# Patient Record
Sex: MALE | Race: WHITE | Employment: UNEMPLOYED | URBAN - METROPOLITAN AREA
[De-identification: names, ages, dates, MRNs, and addresses within clinical notes are randomized per-mention and may not be internally consistent; named-entity substitution may affect disease eponyms.]

---

## 2023-09-06 ENCOUNTER — OFFICE VISIT (OUTPATIENT)
Dept: ENT CLINIC | Age: 68
End: 2023-09-06
Payer: MEDICARE

## 2023-09-06 VITALS
BODY MASS INDEX: 29.92 KG/M2 | WEIGHT: 202 LBS | OXYGEN SATURATION: 93 % | TEMPERATURE: 98.2 F | HEIGHT: 69 IN | DIASTOLIC BLOOD PRESSURE: 89 MMHG | SYSTOLIC BLOOD PRESSURE: 146 MMHG | HEART RATE: 97 BPM

## 2023-09-06 DIAGNOSIS — H91.93 BILATERAL HEARING LOSS, UNSPECIFIED HEARING LOSS TYPE: Primary | ICD-10-CM

## 2023-09-06 DIAGNOSIS — H93.13 TINNITUS OF BOTH EARS: ICD-10-CM

## 2023-09-06 DIAGNOSIS — E05.90 HYPERTHYROIDISM: ICD-10-CM

## 2023-09-06 DIAGNOSIS — J34.2 DNS (DEVIATED NASAL SEPTUM): ICD-10-CM

## 2023-09-06 DIAGNOSIS — H61.23 BILATERAL IMPACTED CERUMEN: ICD-10-CM

## 2023-09-06 DIAGNOSIS — E01.0 THYROMEGALY: ICD-10-CM

## 2023-09-06 PROCEDURE — 99204 OFFICE O/P NEW MOD 45 MIN: CPT | Performed by: STUDENT IN AN ORGANIZED HEALTH CARE EDUCATION/TRAINING PROGRAM

## 2023-09-06 PROCEDURE — 69210 REMOVE IMPACTED EAR WAX UNI: CPT | Performed by: STUDENT IN AN ORGANIZED HEALTH CARE EDUCATION/TRAINING PROGRAM

## 2023-09-06 PROCEDURE — G8427 DOCREV CUR MEDS BY ELIG CLIN: HCPCS | Performed by: STUDENT IN AN ORGANIZED HEALTH CARE EDUCATION/TRAINING PROGRAM

## 2023-09-06 PROCEDURE — 1123F ACP DISCUSS/DSCN MKR DOCD: CPT | Performed by: STUDENT IN AN ORGANIZED HEALTH CARE EDUCATION/TRAINING PROGRAM

## 2023-09-06 PROCEDURE — 1036F TOBACCO NON-USER: CPT | Performed by: STUDENT IN AN ORGANIZED HEALTH CARE EDUCATION/TRAINING PROGRAM

## 2023-09-06 PROCEDURE — G8419 CALC BMI OUT NRM PARAM NOF/U: HCPCS | Performed by: STUDENT IN AN ORGANIZED HEALTH CARE EDUCATION/TRAINING PROGRAM

## 2023-09-06 PROCEDURE — 3017F COLORECTAL CA SCREEN DOC REV: CPT | Performed by: STUDENT IN AN ORGANIZED HEALTH CARE EDUCATION/TRAINING PROGRAM

## 2023-09-06 RX ORDER — DOCUSATE SODIUM 100 MG/1
100 CAPSULE, LIQUID FILLED ORAL 2 TIMES DAILY
COMMUNITY

## 2023-09-06 NOTE — PROGRESS NOTES
[facility]  NEW PATIENT HISTORY AND PHYSICAL NOTE      Patient Name: Shirley Bolton  Medical Record Number:  [id number]  Primary Care Physician:  Pcp No    ChiefComplaint     Chief Complaint   Patient presents with    Ear Problem     Clogged Lt ear,        History of Present Illness     Shirley Bolton is an 79 y.o. male presenting with left ear clogged x 1-2 months. Got water out of right ear with H2O2 and got that side unclogged. + lontstanding chronic gradual hearing loss. + intermittent dull chronic tinnitus. No otalgia. No otorrhea. No history of chronic ear infections. No history of otologic surgery. No family history of early onset hearing loss. + loud noise exposures - machinery. + environmental allergies. Takes zyrtec as needed, approx 4x/week. No nasal spray use. Thyromegaly. Hx of thyroid bioppsy years ago, was bengign. Was on methimazole in past, not recently. Past Medical History     No past medical history on file. Past Surgical History     No past surgical history on file. Family History     No family history on file. Social History     Social History     Tobacco Use    Smoking status: Never    Smokeless tobacco: Never        Allergies     Allergies   Allergen Reactions    Ibuprofen Rash    Naproxen Nausea Only and Shortness Of Breath    Morphine And Related Other (See Comments)     Delirious  Delirious         Medications     Current Outpatient Medications   Medication Sig Dispense Refill    docusate sodium (COLACE) 100 MG capsule Take 1 capsule by mouth 2 times daily      aspirin-acetaminophen-caffeine (EXCEDRIN MIGRAINE) 250-250-65 MG per tablet Take 1 tablet by mouth every 6 hours as needed for Headaches      carbamide peroxide (DEBROX) 6.5 % otic solution Place 5 drops in ear(s) as needed (ear wax buildup) 1 each 3     No current facility-administered medications for this visit.        Review of Systems     REVIEW OF

## 2023-09-07 LAB
T4 FREE SERPL-MCNC: 2.1 NG/DL (ref 0.9–1.8)
TSH SERPL DL<=0.005 MIU/L-ACNC: <0.01 UIU/ML (ref 0.27–4.2)

## 2023-10-19 ENCOUNTER — OFFICE VISIT (OUTPATIENT)
Dept: ENT CLINIC | Age: 68
End: 2023-10-19
Payer: MEDICARE

## 2023-10-19 ENCOUNTER — PROCEDURE VISIT (OUTPATIENT)
Dept: AUDIOLOGY | Age: 68
End: 2023-10-19
Payer: MEDICARE

## 2023-10-19 VITALS
SYSTOLIC BLOOD PRESSURE: 141 MMHG | BODY MASS INDEX: 29.62 KG/M2 | DIASTOLIC BLOOD PRESSURE: 87 MMHG | TEMPERATURE: 98.2 F | HEART RATE: 98 BPM | OXYGEN SATURATION: 94 % | HEIGHT: 69 IN | WEIGHT: 200 LBS

## 2023-10-19 DIAGNOSIS — H93.13 TINNITUS OF BOTH EARS: ICD-10-CM

## 2023-10-19 DIAGNOSIS — H90.3 SENSORINEURAL HEARING LOSS (SNHL) OF BOTH EARS: Primary | ICD-10-CM

## 2023-10-19 DIAGNOSIS — E05.90 HYPERTHYROIDISM: ICD-10-CM

## 2023-10-19 DIAGNOSIS — E01.0 THYROMEGALY: ICD-10-CM

## 2023-10-19 PROCEDURE — G8419 CALC BMI OUT NRM PARAM NOF/U: HCPCS | Performed by: STUDENT IN AN ORGANIZED HEALTH CARE EDUCATION/TRAINING PROGRAM

## 2023-10-19 PROCEDURE — 1123F ACP DISCUSS/DSCN MKR DOCD: CPT | Performed by: STUDENT IN AN ORGANIZED HEALTH CARE EDUCATION/TRAINING PROGRAM

## 2023-10-19 PROCEDURE — 92557 COMPREHENSIVE HEARING TEST: CPT | Performed by: AUDIOLOGIST

## 2023-10-19 PROCEDURE — G8427 DOCREV CUR MEDS BY ELIG CLIN: HCPCS | Performed by: STUDENT IN AN ORGANIZED HEALTH CARE EDUCATION/TRAINING PROGRAM

## 2023-10-19 PROCEDURE — 92567 TYMPANOMETRY: CPT | Performed by: AUDIOLOGIST

## 2023-10-19 PROCEDURE — 1036F TOBACCO NON-USER: CPT | Performed by: STUDENT IN AN ORGANIZED HEALTH CARE EDUCATION/TRAINING PROGRAM

## 2023-10-19 PROCEDURE — 3017F COLORECTAL CA SCREEN DOC REV: CPT | Performed by: STUDENT IN AN ORGANIZED HEALTH CARE EDUCATION/TRAINING PROGRAM

## 2023-10-19 PROCEDURE — G8484 FLU IMMUNIZE NO ADMIN: HCPCS | Performed by: STUDENT IN AN ORGANIZED HEALTH CARE EDUCATION/TRAINING PROGRAM

## 2023-10-19 PROCEDURE — 99213 OFFICE O/P EST LOW 20 MIN: CPT | Performed by: STUDENT IN AN ORGANIZED HEALTH CARE EDUCATION/TRAINING PROGRAM

## 2023-10-19 RX ORDER — METHIMAZOLE 10 MG/1
TABLET ORAL
COMMUNITY
Start: 2023-09-28

## 2023-10-19 RX ORDER — LISINOPRIL 20 MG/1
20 TABLET ORAL DAILY
Qty: 30 TABLET | Refills: 11 | COMMUNITY
Start: 2023-09-28 | End: 2024-09-27

## 2023-10-19 NOTE — PROGRESS NOTES
Jose R Chacon   1955, 79 y.o. male   [id number]       Referring Provider: Lali Flores DO  Referral Type: In an order in [address] [place] [address]    Reason for Visit: Evaluation of suspected change in hearing, tinnitus, or balance. ADULT AUDIOLOGIC EVALUATION      Jose R Chacon is a 79 y.o. male seen today, 10/19/2023, for an initial audiologic evaluation. AUDIOLOGIC AND OTHER PERTINENT MEDICAL HISTORY:        Jose R Chacon noted tinnitus bilaterally, comes and goes, persistent after cerumen removal with Dr. Ricky Lamar last month; feels his hearing improved after ear cleaning and that he is hearing well at this time; some dizziness. Jose R Chacon denied otalgia and otorrhea, as well as any other changes since his last ENT appointment. IMPRESSIONS:       Today's results are consistent with bilateral sensorineural hearing loss with excellent word recognition for both ears; right ear with hypermobile TM and left ear with normal middle ear function. Hearing loss is significant enough to result in difficulty understanding speech in at lest some listening environments. Discussed tinnitus management strategies and considerations for amplification when ready. Follow medical recommendations from Dr. iRcky Lamar. ASSESSMENT AND FINDINGS:       Otoscopy revealed: Cerumen observed in ear canals bilaterally, proceeded with supra aural headphones following tympanograms with normal middle ear function      RIGHT EAR:  Hearing Sensitivity: Mild through 3000 Hz steeply sloping to severe sensorineural hearing loss. Speech Recognition Threshold: 30 dBHL  Word Recognition: Excellent (100%), based on NU-6 25-word list at 55 dBHL, masked, using recorded speech stimuli. Tympanometry: Normal peak pressure with high compliance, Type Ad tympanogram, consistent with hypermobile tympanic membrane. LEFT EAR:  Hearing Sensitivity: Mild through 3000 Hz steeply sloping to severe sensorineural hearing loss.   Speech Recognition

## 2023-11-30 ENCOUNTER — OFFICE VISIT (OUTPATIENT)
Dept: ENDOCRINOLOGY | Age: 68
End: 2023-11-30
Payer: MEDICARE

## 2023-11-30 VITALS
WEIGHT: 201.4 LBS | HEART RATE: 91 BPM | BODY MASS INDEX: 29.83 KG/M2 | HEIGHT: 69 IN | RESPIRATION RATE: 16 BRPM | SYSTOLIC BLOOD PRESSURE: 140 MMHG | DIASTOLIC BLOOD PRESSURE: 81 MMHG

## 2023-11-30 DIAGNOSIS — E04.1 THYROID NODULE, COLD: ICD-10-CM

## 2023-11-30 DIAGNOSIS — E05.90 HYPERTHYROIDISM: ICD-10-CM

## 2023-11-30 DIAGNOSIS — E05.00 GRAVES DISEASE: Primary | ICD-10-CM

## 2023-11-30 PROCEDURE — 99204 OFFICE O/P NEW MOD 45 MIN: CPT | Performed by: INTERNAL MEDICINE

## 2023-11-30 PROCEDURE — G8484 FLU IMMUNIZE NO ADMIN: HCPCS | Performed by: INTERNAL MEDICINE

## 2023-11-30 PROCEDURE — G8419 CALC BMI OUT NRM PARAM NOF/U: HCPCS | Performed by: INTERNAL MEDICINE

## 2023-11-30 PROCEDURE — 1036F TOBACCO NON-USER: CPT | Performed by: INTERNAL MEDICINE

## 2023-11-30 PROCEDURE — 1123F ACP DISCUSS/DSCN MKR DOCD: CPT | Performed by: INTERNAL MEDICINE

## 2023-11-30 PROCEDURE — 3017F COLORECTAL CA SCREEN DOC REV: CPT | Performed by: INTERNAL MEDICINE

## 2023-11-30 PROCEDURE — G8427 DOCREV CUR MEDS BY ELIG CLIN: HCPCS | Performed by: INTERNAL MEDICINE

## 2023-11-30 RX ORDER — COLESEVELAM 180 1/1
625 TABLET ORAL 2 TIMES DAILY WITH MEALS
COMMUNITY
Start: 2023-10-02

## 2023-11-30 RX ORDER — METOPROLOL SUCCINATE 50 MG/1
50 TABLET, EXTENDED RELEASE ORAL DAILY
COMMUNITY
Start: 2023-11-16 | End: 2024-11-15

## 2023-11-30 RX ORDER — ACETAMINOPHEN/DP-HYDRAMINE 500MG-38MG
TABLET ORAL
COMMUNITY
Start: 2011-08-02

## 2023-11-30 NOTE — PROGRESS NOTES
when clinically appropriate.     -----------------------------------------------------------------  Clinical:  The patient has a history of hyperthyroidism and thyroid nodule and is referred for the evaluation. Radiopharmaceutical: I-123 capsule 206 uCi PO. Technical:  Thyroid imaging with a single quantitative uptake determination was performed 24 hours after oral radioiodine administration. Findings:  The 24-hour thyroid uptake is 33.1%, which is elevated. The thyroid images demonstrate a large area of no uptake in the upper pole of the left thyroid gland. The remainder of the thyroid gland is symmetrically enlarged and demonstrates uniform uptake of the radionuclide. Impressions:   1. Focal cold area in the upper pole of the left thyroid gland corresponding to nodule seen on recent ultrasound. This may represent a nonfunctioning adenoma or thyroid neoplasm, histologic correlation is recommended. 2. Remainder of the thyroid gland is enlarged with an elevated 24-hour thyroid uptake consistent with an underlying diffuse toxic goiter. Approved by Ave Sandhoff on 3/12/2015 2:49 PM     -------------------------------------------------------------------------------------    FINAL PATHOLOGICAL DIAGNOSIS:   SPECIMEN:   Fine Needle Aspiration, Left Thyroid       FINAL CYTOLOGIC DIAGNOSIS:   FOLLICULAR LESION OF UNDETERMINED SIGNIFICANCE. Assessment/Plan:   1. Graves disease, biochemical and clinical hyperthyroidism. Diagnosed initially around 2014. He recently restarted methimazole therapy in November 2023. He previously noted tremors and palpitations. Symptoms did improve to some extent over the past 2 weeks. For now would recommend to continue methimazole 10 mg daily with plan to reassess TSH and free T4 in 3 months. Discussed that long-term use of antithyroid medications would be reasonable. -     T4, Free; Future  -     TSH; Future    2. Hyperthyroidism  3.  Thyroid nodule,

## 2024-01-23 ENCOUNTER — HOSPITAL ENCOUNTER (OUTPATIENT)
Age: 69
Discharge: HOME | End: 2024-01-23
Attending: INTERNAL MEDICINE
Payer: MEDICARE

## 2024-01-23 ENCOUNTER — HOSPITAL ENCOUNTER (OUTPATIENT)
Dept: ULTRASOUND IMAGING | Age: 69
Discharge: HOME | End: 2024-01-23
Attending: INTERNAL MEDICINE
Payer: MEDICARE

## 2024-01-23 DIAGNOSIS — E04.1 THYROID NODULE, COLD: ICD-10-CM

## 2024-01-23 LAB
T4 FREE SERPL-MCNC: 0.5 NG/DL (ref 0.9–1.8)
TSH SERPL DL<=0.005 MIU/L-ACNC: 0.29 UIU/ML (ref 0.27–4.2)

## 2024-01-23 PROCEDURE — 36415 COLL VENOUS BLD VENIPUNCTURE: CPT

## 2024-01-23 PROCEDURE — 76536 US EXAM OF HEAD AND NECK: CPT

## 2024-01-23 PROCEDURE — 84439 ASSAY OF FREE THYROXINE: CPT

## 2024-01-23 PROCEDURE — 84443 ASSAY THYROID STIM HORMONE: CPT

## 2024-01-25 DIAGNOSIS — E05.00 GRAVES DISEASE: Primary | ICD-10-CM

## 2024-01-25 RX ORDER — METHIMAZOLE 5 MG/1
5 TABLET ORAL DAILY
Qty: 90 TABLET | Refills: 1 | Status: SHIPPED | OUTPATIENT
Start: 2024-01-25

## 2024-02-23 ENCOUNTER — HOSPITAL ENCOUNTER (OUTPATIENT)
Age: 69
Discharge: HOME | End: 2024-02-23
Payer: MEDICARE

## 2024-02-23 DIAGNOSIS — E05.00 GRAVES DISEASE: ICD-10-CM

## 2024-02-23 LAB
T4 FREE SERPL-MCNC: 1.1 NG/DL (ref 0.9–1.8)
TSH SERPL DL<=0.005 MIU/L-ACNC: 0.05 UIU/ML (ref 0.27–4.2)

## 2024-02-23 PROCEDURE — 84439 ASSAY OF FREE THYROXINE: CPT

## 2024-02-23 PROCEDURE — 84443 ASSAY THYROID STIM HORMONE: CPT

## 2024-02-23 PROCEDURE — 36415 COLL VENOUS BLD VENIPUNCTURE: CPT

## 2024-02-29 ENCOUNTER — OFFICE VISIT (OUTPATIENT)
Dept: ENDOCRINOLOGY | Age: 69
End: 2024-02-29
Payer: MEDICARE

## 2024-02-29 VITALS
SYSTOLIC BLOOD PRESSURE: 147 MMHG | HEART RATE: 95 BPM | HEIGHT: 69 IN | WEIGHT: 202.8 LBS | RESPIRATION RATE: 16 BRPM | DIASTOLIC BLOOD PRESSURE: 92 MMHG | BODY MASS INDEX: 30.04 KG/M2

## 2024-02-29 DIAGNOSIS — E04.1 THYROID NODULE, COLD: ICD-10-CM

## 2024-02-29 DIAGNOSIS — E05.90 HYPERTHYROIDISM: Primary | ICD-10-CM

## 2024-02-29 DIAGNOSIS — E05.00 GRAVES DISEASE: ICD-10-CM

## 2024-02-29 PROCEDURE — 1036F TOBACCO NON-USER: CPT | Performed by: INTERNAL MEDICINE

## 2024-02-29 PROCEDURE — G8419 CALC BMI OUT NRM PARAM NOF/U: HCPCS | Performed by: INTERNAL MEDICINE

## 2024-02-29 PROCEDURE — G8484 FLU IMMUNIZE NO ADMIN: HCPCS | Performed by: INTERNAL MEDICINE

## 2024-02-29 PROCEDURE — 99214 OFFICE O/P EST MOD 30 MIN: CPT | Performed by: INTERNAL MEDICINE

## 2024-02-29 PROCEDURE — G8427 DOCREV CUR MEDS BY ELIG CLIN: HCPCS | Performed by: INTERNAL MEDICINE

## 2024-02-29 PROCEDURE — 3017F COLORECTAL CA SCREEN DOC REV: CPT | Performed by: INTERNAL MEDICINE

## 2024-02-29 PROCEDURE — 1123F ACP DISCUSS/DSCN MKR DOCD: CPT | Performed by: INTERNAL MEDICINE

## 2024-02-29 RX ORDER — METOPROLOL TARTRATE AND HYDROCHLOROTHIAZIDE 50; 25 MG/1; MG/1
1 TABLET ORAL 2 TIMES DAILY
COMMUNITY
Start: 2012-09-25

## 2024-02-29 RX ORDER — METHIMAZOLE 5 MG/1
7.5 TABLET ORAL DAILY
Qty: 90 TABLET | Refills: 1
Start: 2024-02-29

## 2024-02-29 NOTE — PROGRESS NOTES
methimazole therapy in November 2023.  He previously noted tremors and palpitations.  Clinically, symptoms did improve.  Biochemically TSH remains suppressed while on methimazole 5 mg daily.  Will increase dose to 7.5 mg once daily with plan to reassess TSH and free T4 in 2 and 6 months.    Discussed that long-term use of antithyroid medications would be reasonable.  -     T4, Free; Future  -     TSH; Future    2. Hyperthyroidism  3. Thyroid nodule, cold  Prior evaluation in 2015 showed thyroid uptake and scan showing a cold nodule.  Ultrasound revealed a 3 cm left-sided thyroid nodule.  Prior biopsy was FLUS.  Repeat thyroid ultrasound showed that the left-sided fullness corresponds to a large 6 cm cyst.  No solid component noted.  He denies any major compressive symptoms and would like to avoid surgery.  We will therefore continue to monitor.  On the right thyroid lobe, there is TI-RADS 4 nodule that appears hypoechoic but not meeting criteria for biopsy.  We will plan to reassess with repeat ultrasound in 1 year.    Return in about 6 months (around 9/4/2024) for Hyperthyroidism.    Sola Khanna MD   4:05 PM  2/29/2024    Thank you very much for allowing me to take care of this patient along with you.    Comment: This documentation utilized a software-based speech recognition technology (voice-to-text process), where occasional errors in transcription can occur.

## 2024-07-02 ENCOUNTER — HOSPITAL ENCOUNTER (OUTPATIENT)
Age: 69
Discharge: HOME | End: 2024-07-02
Payer: MEDICARE

## 2024-07-02 DIAGNOSIS — E05.00 GRAVES DISEASE: ICD-10-CM

## 2024-07-02 PROCEDURE — 84439 ASSAY OF FREE THYROXINE: CPT

## 2024-07-02 PROCEDURE — 84443 ASSAY THYROID STIM HORMONE: CPT

## 2024-07-02 PROCEDURE — 36415 COLL VENOUS BLD VENIPUNCTURE: CPT

## 2024-07-03 LAB
T4 FREE SERPL-MCNC: 1.6 NG/DL (ref 0.9–1.8)
TSH SERPL DL<=0.005 MIU/L-ACNC: <0.01 UIU/ML (ref 0.27–4.2)

## 2024-07-05 ENCOUNTER — TELEPHONE (OUTPATIENT)
Dept: ENDOCRINOLOGY | Age: 69
End: 2024-07-05

## 2024-07-05 NOTE — TELEPHONE ENCOUNTER
----- Message from Sola Khanna MD sent at 7/3/2024  1:23 PM EDT -----  Please advise patient that his thyroid labs came back showing that his thyroid is slightly fast.  He may though continue on the current dose of methimazole 7.5 mg daily.  Please advise to ensure consistent intake and repeat labs before next visit end of August.  Thank you.

## 2024-08-30 ENCOUNTER — OFFICE VISIT (OUTPATIENT)
Dept: ENDOCRINOLOGY | Age: 69
End: 2024-08-30

## 2024-08-30 VITALS
HEIGHT: 69 IN | DIASTOLIC BLOOD PRESSURE: 74 MMHG | SYSTOLIC BLOOD PRESSURE: 122 MMHG | BODY MASS INDEX: 28.58 KG/M2 | WEIGHT: 193 LBS | HEART RATE: 88 BPM

## 2024-08-30 DIAGNOSIS — E04.1 THYROID NODULE, COLD: ICD-10-CM

## 2024-08-30 DIAGNOSIS — E05.00 GRAVES DISEASE: Primary | ICD-10-CM

## 2024-08-30 RX ORDER — METHIMAZOLE 5 MG/1
7.5 TABLET ORAL DAILY
Qty: 90 TABLET | Refills: 1 | Status: SHIPPED | OUTPATIENT
Start: 2024-08-30

## 2024-08-30 NOTE — PROGRESS NOTES
[facility] Endocrinology  Initial Patient Visit        Patient:  John Bowden                                               : 1955    MRN: [id number]  Date : 2024      CHIEF COMPLAINT:   Chief Complaint   Patient presents with    Thyroid Problem    New Patient        HISTORY OF PRESENT ILLNESS:   John Bowden is a pleasant 69 y.o. male who presents for follow up of Graves' disease.  He has history of hypertension, prior history of kidney stones, wrist fracture from , left humeral fracture in , and has a BMI of 29.    Patient was diagnosed with hyperthyroidism around .  In , he had further evaluation showing biochemical hyperthyroidism, elevated TSI and TSH receptor antibody.  Thyroid uptake and scan showed diffuse increased uptake except for a large area in the left upper thyroid lesion suggestive of a cold nodule.  Thyroid ultrasound in 2015 showed a prominent 3 x 2.6 x 2 cm nodule within the left lobe.    Per clinic note in 2015:  US in clinic showed a complex, hypoechoic nodule on left lobe of thyroid measuring approximately 2.5 x 2.0 x 3.0 cm with a central area of degeneration/cystic component and some peripheral vascularity.     FNA in  showed FLUS. No further follow-up or biopsies was done for that.  He was treated with methimazole for about 5 years.  He stopped as he lost his doctor.    Labs in 2023 showed suppressed TSH and elevated free T4 at 2.1.    Patient reports longstanding history of palpitations even as a child. He also reports longstanding tremors.   He reports chronic difficulty swallowing.  He reports that this runs in the family and attributes that to esophageal issues.  He reports that his father and sister require frequent esophageal dilations.  He would like to avoid doing that.    He restarted back on methimazole 10 mg daily in mid 2023.    Thyroid ultrasound completed in 2024 showed right-sided lower nodule that measures 1.2 x  been taking only 5 mg daily of methimazole.  Will increase dose to 7.5 mg once daily with plan to reassess TSH and free T4 in 3 months.  Discussed that long-term use of antithyroid medications would be reasonable.  -     T4, Free; Future  -     TSH; Future    2. Hyperthyroidism  3. Thyroid nodule, cold  Prior evaluation in 2015 showed thyroid uptake and scan showing a cold nodule.  Ultrasound revealed a 3 cm left-sided thyroid nodule.  Prior biopsy was FLUS.  Repeat thyroid ultrasound showed that the left-sided fullness corresponds to a large 6 cm cyst.  No solid component noted.  He denies any major compressive symptoms and would like to avoid surgery.  We will therefore continue to monitor.  On the right thyroid lobe, there is TI-RADS 4 nodule that appears hypoechoic but not meeting criteria for biopsy.  This did not appear cold on prior uptake and scan.  Will update ultrasound in November.        Return in about 3 months (around 11/30/2024) for Hyperthyroidism.    Sola Khanna MD   1:29 PM  8/30/2024    Thank you very much for allowing me to take care of this patient along with you.    Comment: This documentation utilized a software-based speech recognition technology (voice-to-text process), where occasional errors in transcription can occur.

## 2025-02-28 DIAGNOSIS — E05.00 GRAVES DISEASE: ICD-10-CM

## 2025-03-03 RX ORDER — METHIMAZOLE 5 MG/1
TABLET ORAL
Qty: 90 TABLET | Refills: 0 | Status: SHIPPED | OUTPATIENT
Start: 2025-03-03

## 2025-03-03 NOTE — TELEPHONE ENCOUNTER
Medication:   Requested Prescriptions     Pending Prescriptions Disp Refills    methIMAzole (TAPAZOLE) 5 MG tablet [Pharmacy Med Name: METHIMAZOLE 5 MG TABLET] 90 tablet 1     Sig: TAKE 1 AND 1/2 TABLET BY MOUTH DAILY     Last appt: 8/30/2024   Next appt: Visit date not found      Last Thyroid:   Lab Results   Component Value Date/Time    TSH <0.01 07/02/2024 05:05 PM    TSH <0.01 09/06/2023 03:00 PM    T4FREE 1.6 07/02/2024 05:05 PM

## 2025-04-14 ENCOUNTER — APPOINTMENT (OUTPATIENT)
Dept: MRI IMAGING | Age: 70
End: 2025-04-14
Payer: COMMERCIAL

## 2025-04-14 ENCOUNTER — APPOINTMENT (OUTPATIENT)
Dept: CT IMAGING | Age: 70
End: 2025-04-14
Payer: COMMERCIAL

## 2025-04-14 ENCOUNTER — HOSPITAL ENCOUNTER (OUTPATIENT)
Age: 70
Setting detail: OBSERVATION
Discharge: HOME | End: 2025-04-15
Attending: EMERGENCY MEDICINE | Admitting: HOSPITALIST
Payer: COMMERCIAL

## 2025-04-14 DIAGNOSIS — R27.0 ATAXIA: Primary | ICD-10-CM

## 2025-04-14 LAB
ALBUMIN SERPL-MCNC: 3.9 G/DL (ref 3.4–5)
ALP SERPL-CCNC: 101 U/L (ref 40–129)
ALT SERPL-CCNC: 9 U/L (ref 10–40)
AMMONIA PLAS-SCNC: 37 UMOL/L (ref 16–60)
ANION GAP SERPL CALCULATED.3IONS-SCNC: 11 MMOL/L (ref 3–16)
AST SERPL-CCNC: 18 U/L (ref 15–37)
BASE EXCESS BLDV CALC-SCNC: 0.1 MMOL/L (ref -3–3)
BASOPHILS # BLD: 0 K/UL (ref 0–0.2)
BASOPHILS NFR BLD: 0.5 %
BILIRUB DIRECT SERPL-MCNC: <0.1 MG/DL (ref 0–0.3)
BILIRUB INDIRECT SERPL-MCNC: 0.2 MG/DL (ref 0–1)
BILIRUB SERPL-MCNC: 0.3 MG/DL (ref 0–1)
BUN SERPL-MCNC: 18 MG/DL (ref 7–20)
CALCIUM SERPL-MCNC: 9.1 MG/DL (ref 8.3–10.6)
CHLORIDE SERPL-SCNC: 104 MMOL/L (ref 99–110)
CO2 BLDV-SCNC: 60 MMOL/L
CO2 SERPL-SCNC: 24 MMOL/L (ref 21–32)
COHGB MFR BLDV: 3.4 % (ref 0–1.5)
CREAT SERPL-MCNC: 0.8 MG/DL (ref 0.8–1.3)
DEPRECATED RDW RBC AUTO: 14.8 % (ref 12.4–15.4)
EOSINOPHIL # BLD: 0.1 K/UL (ref 0–0.6)
EOSINOPHIL NFR BLD: 2.1 %
GFR SERPLBLD CREATININE-BSD FMLA CKD-EPI: >90 ML/MIN/{1.73_M2}
GLUCOSE SERPL-MCNC: 97 MG/DL (ref 70–99)
HCO3 BLDV-SCNC: 25.6 MMOL/L (ref 23–29)
HCT VFR BLD AUTO: 43.4 % (ref 40.5–52.5)
HGB BLD-MCNC: 14.1 G/DL (ref 13.5–17.5)
LYMPHOCYTES # BLD: 1.9 K/UL (ref 1–5.1)
LYMPHOCYTES NFR BLD: 27.7 %
MCH RBC QN AUTO: 26.3 PG (ref 26–34)
MCHC RBC AUTO-ENTMCNC: 32.4 G/DL (ref 31–36)
MCV RBC AUTO: 81.2 FL (ref 80–100)
METHGB MFR BLDV: 1 %
MONOCYTES # BLD: 0.6 K/UL (ref 0–1.3)
MONOCYTES NFR BLD: 9.2 %
NEUTROPHILS # BLD: 4.1 K/UL (ref 1.7–7.7)
NEUTROPHILS NFR BLD: 60.5 %
O2 CT VFR BLDV CALC: 20 VOL %
O2 THERAPY: ABNORMAL
PCO2 BLDV: 43.8 MMHG (ref 40–50)
PH BLDV: 7.38 [PH] (ref 7.35–7.45)
PLATELET # BLD AUTO: 191 K/UL (ref 135–450)
PMV BLD AUTO: 7.2 FL (ref 5–10.5)
PO2 BLDV: 161 MMHG (ref 25–40)
POTASSIUM SERPL-SCNC: 3.7 MMOL/L (ref 3.5–5.1)
PROT SERPL-MCNC: 6.6 G/DL (ref 6.4–8.2)
RBC # BLD AUTO: 5.34 M/UL (ref 4.2–5.9)
SAO2 % BLDV: 100 %
SODIUM SERPL-SCNC: 139 MMOL/L (ref 136–145)
TROPONIN, HIGH SENSITIVITY: 7 NG/L (ref 0–22)
WBC # BLD AUTO: 6.7 K/UL (ref 4–11)

## 2025-04-14 PROCEDURE — 84484 ASSAY OF TROPONIN QUANT: CPT

## 2025-04-14 PROCEDURE — 80048 BASIC METABOLIC PNL TOTAL CA: CPT

## 2025-04-14 PROCEDURE — 93005 ELECTROCARDIOGRAM TRACING: CPT | Performed by: EMERGENCY MEDICINE

## 2025-04-14 PROCEDURE — 82803 BLOOD GASES ANY COMBINATION: CPT

## 2025-04-14 PROCEDURE — 99285 EMERGENCY DEPT VISIT HI MDM: CPT

## 2025-04-14 PROCEDURE — 72156 MRI NECK SPINE W/O & W/DYE: CPT

## 2025-04-14 PROCEDURE — A9577 INJ MULTIHANCE: HCPCS | Performed by: EMERGENCY MEDICINE

## 2025-04-14 PROCEDURE — 82140 ASSAY OF AMMONIA: CPT

## 2025-04-14 PROCEDURE — 72125 CT NECK SPINE W/O DYE: CPT

## 2025-04-14 PROCEDURE — G0378 HOSPITAL OBSERVATION PER HR: HCPCS

## 2025-04-14 PROCEDURE — 6370000000 HC RX 637 (ALT 250 FOR IP): Performed by: PHYSICIAN ASSISTANT

## 2025-04-14 PROCEDURE — 85025 COMPLETE CBC W/AUTO DIFF WBC: CPT

## 2025-04-14 PROCEDURE — 80076 HEPATIC FUNCTION PANEL: CPT

## 2025-04-14 PROCEDURE — 70551 MRI BRAIN STEM W/O DYE: CPT

## 2025-04-14 PROCEDURE — 2500000003 HC RX 250 WO HCPCS: Performed by: PHYSICIAN ASSISTANT

## 2025-04-14 PROCEDURE — 6360000004 HC RX CONTRAST MEDICATION: Performed by: EMERGENCY MEDICINE

## 2025-04-14 PROCEDURE — 70450 CT HEAD/BRAIN W/O DYE: CPT

## 2025-04-14 RX ORDER — SODIUM CHLORIDE 0.9 % (FLUSH) 0.9 %
10 SYRINGE (ML) INJECTION PRN
Status: DISCONTINUED | OUTPATIENT
Start: 2025-04-14 | End: 2025-04-15 | Stop reason: HOSPADM

## 2025-04-14 RX ORDER — ASPIRIN 81 MG/1
81 TABLET, CHEWABLE ORAL DAILY
Status: DISCONTINUED | OUTPATIENT
Start: 2025-04-15 | End: 2025-04-15 | Stop reason: HOSPADM

## 2025-04-14 RX ORDER — ACETAMINOPHEN 325 MG/1
650 TABLET ORAL EVERY 4 HOURS PRN
Status: DISCONTINUED | OUTPATIENT
Start: 2025-04-14 | End: 2025-04-15 | Stop reason: HOSPADM

## 2025-04-14 RX ORDER — ACETAMINOPHEN 650 MG/1
650 SUPPOSITORY RECTAL EVERY 4 HOURS PRN
Status: DISCONTINUED | OUTPATIENT
Start: 2025-04-14 | End: 2025-04-15 | Stop reason: HOSPADM

## 2025-04-14 RX ORDER — SODIUM CHLORIDE 0.9 % (FLUSH) 0.9 %
10 SYRINGE (ML) INJECTION EVERY 12 HOURS SCHEDULED
Status: DISCONTINUED | OUTPATIENT
Start: 2025-04-14 | End: 2025-04-15 | Stop reason: HOSPADM

## 2025-04-14 RX ORDER — CETIRIZINE HYDROCHLORIDE 10 MG/1
10 TABLET ORAL DAILY
COMMUNITY

## 2025-04-14 RX ORDER — ATORVASTATIN CALCIUM 80 MG/1
80 TABLET, FILM COATED ORAL NIGHTLY
Status: DISCONTINUED | OUTPATIENT
Start: 2025-04-14 | End: 2025-04-15 | Stop reason: HOSPADM

## 2025-04-14 RX ORDER — ASPIRIN 300 MG/1
300 SUPPOSITORY RECTAL DAILY
Status: DISCONTINUED | OUTPATIENT
Start: 2025-04-15 | End: 2025-04-15 | Stop reason: HOSPADM

## 2025-04-14 RX ORDER — SODIUM CHLORIDE 9 MG/ML
INJECTION, SOLUTION INTRAVENOUS PRN
Status: DISCONTINUED | OUTPATIENT
Start: 2025-04-14 | End: 2025-04-15 | Stop reason: HOSPADM

## 2025-04-14 RX ORDER — SENNOSIDES A AND B 8.6 MG/1
1 TABLET, FILM COATED ORAL 2 TIMES DAILY PRN
Status: DISCONTINUED | OUTPATIENT
Start: 2025-04-14 | End: 2025-04-15 | Stop reason: HOSPADM

## 2025-04-14 RX ORDER — ONDANSETRON 2 MG/ML
4 INJECTION INTRAMUSCULAR; INTRAVENOUS EVERY 6 HOURS PRN
Status: DISCONTINUED | OUTPATIENT
Start: 2025-04-14 | End: 2025-04-15 | Stop reason: HOSPADM

## 2025-04-14 RX ORDER — ENOXAPARIN SODIUM 100 MG/ML
40 INJECTION SUBCUTANEOUS DAILY
Status: DISCONTINUED | OUTPATIENT
Start: 2025-04-15 | End: 2025-04-15 | Stop reason: HOSPADM

## 2025-04-14 RX ADMIN — SODIUM CHLORIDE, PRESERVATIVE FREE 10 ML: 5 INJECTION INTRAVENOUS at 22:39

## 2025-04-14 RX ADMIN — ATORVASTATIN CALCIUM 80 MG: 80 TABLET, FILM COATED ORAL at 22:40

## 2025-04-14 RX ADMIN — GADOBENATE DIMEGLUMINE 15 ML: 529 INJECTION, SOLUTION INTRAVENOUS at 20:46

## 2025-04-14 ASSESSMENT — PAIN - FUNCTIONAL ASSESSMENT: PAIN_FUNCTIONAL_ASSESSMENT: NONE - DENIES PAIN

## 2025-04-14 NOTE — ED PROVIDER NOTES
Emergency Department Encounter    Patient: John Bowden  MRN: [id number]  : 1955  Date of Evaluation: 2025  ED Provider:  ZAY GIRON MD    Triage Chief Complaint:   Extremity Weakness (Pt presents to the ER with the complaint of weakness in the legs. Pt states he is having difficulties walking and with his balance. Pt states he has been falling numerous times. Pt states this has been going on for several months. Pt states he has been seen numerous times for this without resolve. )    [place]:  John Bowden is a 69 y.o. male that presents to the ER for evaluation of increasing gait disorder and frequent falls.  He has had a prior history of posterior circulation stroke, with now worsening ataxia and frequent falls.  No alcohol use or abuse.  No withdrawal medications.  Collateral history is obtained from his sister.  He has significant dysmetria and ataxia worsening wide-based gait.  Chronic neck pain.  Duration of symptoms have been exacerbating over the last 3 months.    ROS - see HPI, below listed is current ROS at time of my eval:  General:  No fevers, no chills, no weakness  Eyes:  No recent vison changes, no discharge  ENT:  No sore throat, no nasal congestion, no hearing changes  Cardiovascular:  No chest pain  Respiratory:  No shortness of breath  Gastrointestinal:  No pain, no nausea, no vomiting, no diarrhea  Musculoskeletal:  No muscle pain, no joint pain  Skin:  No rash, no pruritis, no easy bruising  Neurologic:  No speech problems, + headache, no extremity numbness, no extremity tingling, + extremity weakness, no neck pain or stiffness  Psychiatric:  No anxiety  Extremities:  no edema, no pain    Past Medical History:   Diagnosis Date    Concussion     Falls      History reviewed. No pertinent surgical history.  Family History   Problem Relation Age of Onset    Thyroid Disease Maternal Grandmother      Social History     Socioeconomic History    Marital status: Single      mg/dL    BUN 18 7 - 20 mg/dL    Creatinine 0.8 0.8 - 1.3 mg/dL    Est, Glom Filt Rate >90 >60    Calcium 8.8 8.3 - 10.6 mg/dL   CBC   Result Value Ref Range    WBC 6.0 4.0 - 11.0 K/uL    RBC 5.23 4.20 - 5.90 M/uL    Hemoglobin 13.8 13.5 - 17.5 g/dL    Hematocrit 41.6 40.5 - 52.5 %    MCV 79.4 (L) 80.0 - 100.0 fL    MCH 26.3 26.0 - 34.0 pg    MCHC 33.2 31.0 - 36.0 g/dL    RDW 14.4 12.4 - 15.4 %    Platelets 182 135 - 450 K/uL    MPV 7.0 5.0 - 10.5 fL   Lipid Panel   Result Value Ref Range    Cholesterol, Total 120 0 - 199 mg/dL    Triglycerides 74 0 - 150 mg/dL    HDL 41 40 - 60 mg/dL    LDL Cholesterol 64 <100 mg/dL    VLDL Cholesterol Calculated 15 Not Established mg/dL   TSH reflex to FT4   Result Value Ref Range    TSH Reflex FT4 <0.01 (L) 0.27 - 4.20 uIU/mL   Vitamin B12 & Folate   Result Value Ref Range    Vitamin B-12 573 211 - 911 pg/mL    Folate 11.20 4.78 - 24.20 ng/mL   Hemoglobin A1C   Result Value Ref Range    Hemoglobin A1C 5.6 See comment %    Estimated Avg Glucose 114.0 mg/dL   T4, Free   Result Value Ref Range    T4 Free 1.2 0.9 - 1.8 ng/dL   EKG 12 Lead   Result Value Ref Range    Ventricular Rate 82 BPM    Atrial Rate 82 BPM    P-R Interval 168 ms    QRS Duration 92 ms    Q-T Interval 370 ms    QTc Calculation (Bazett) 432 ms    P Axis 39 degrees    R Axis -30 degrees    T Axis 36 degrees    Diagnosis       Normal sinus rhythmMinimal voltage criteria for LVH, may be normal variant ( R in aVL )Abnormal ECGConfirmed by GIUSEPPE BRAVO ([id number]) on 4/15/2025 2:07:16 PM      Radiographs (if obtained):  Radiologist's Report Reviewed:  CT head: No new infarct mild ventricle enlargement.  CT cervical spine, no fracture or dislocation.  MRI brain no obvious new infarct or edema.  MRI cervical spine no spinal cord compression or demyelinating    MDM:  Patient presents ER for evaluation of unclear etiology of his chronic severe ataxia which may be residual and effect from prior infarct no new spinal cord

## 2025-04-15 VITALS
RESPIRATION RATE: 17 BRPM | DIASTOLIC BLOOD PRESSURE: 79 MMHG | WEIGHT: 191.8 LBS | OXYGEN SATURATION: 96 % | BODY MASS INDEX: 30.1 KG/M2 | HEART RATE: 83 BPM | SYSTOLIC BLOOD PRESSURE: 131 MMHG | TEMPERATURE: 97.7 F | HEIGHT: 67 IN

## 2025-04-15 PROBLEM — G30.9 DEMENTIA DUE TO ALZHEIMER'S DISEASE (HCC): Status: ACTIVE | Noted: 2025-04-15

## 2025-04-15 PROBLEM — I10 HTN (HYPERTENSION), BENIGN: Status: ACTIVE | Noted: 2025-04-15

## 2025-04-15 PROBLEM — F02.80 DEMENTIA DUE TO ALZHEIMER'S DISEASE (HCC): Status: ACTIVE | Noted: 2025-04-15

## 2025-04-15 LAB
ANION GAP SERPL CALCULATED.3IONS-SCNC: 8 MMOL/L (ref 3–16)
BUN SERPL-MCNC: 18 MG/DL (ref 7–20)
CALCIUM SERPL-MCNC: 8.8 MG/DL (ref 8.3–10.6)
CHLORIDE SERPL-SCNC: 104 MMOL/L (ref 99–110)
CO2 SERPL-SCNC: 27 MMOL/L (ref 21–32)
CREAT SERPL-MCNC: 0.8 MG/DL (ref 0.8–1.3)
DEPRECATED RDW RBC AUTO: 14.4 % (ref 12.4–15.4)
EKG ATRIAL RATE: 82 BPM
EKG DIAGNOSIS: NORMAL
EKG P AXIS: 39 DEGREES
EKG P-R INTERVAL: 168 MS
EKG Q-T INTERVAL: 370 MS
EKG QRS DURATION: 92 MS
EKG QTC CALCULATION (BAZETT): 432 MS
EKG R AXIS: -30 DEGREES
EKG T AXIS: 36 DEGREES
EKG VENTRICULAR RATE: 82 BPM
EST. AVERAGE GLUCOSE BLD GHB EST-MCNC: 114 MG/DL
FOLATE SERPL-MCNC: 11.2 NG/ML (ref 4.78–24.2)
GFR SERPLBLD CREATININE-BSD FMLA CKD-EPI: >90 ML/MIN/{1.73_M2}
GLUCOSE SERPL-MCNC: 100 MG/DL (ref 70–99)
HBA1C MFR BLD: 5.6 %
HCT VFR BLD AUTO: 41.6 % (ref 40.5–52.5)
HGB BLD-MCNC: 13.8 G/DL (ref 13.5–17.5)
MCH RBC QN AUTO: 26.3 PG (ref 26–34)
MCHC RBC AUTO-ENTMCNC: 33.2 G/DL (ref 31–36)
MCV RBC AUTO: 79.4 FL (ref 80–100)
PLATELET # BLD AUTO: 182 K/UL (ref 135–450)
PMV BLD AUTO: 7 FL (ref 5–10.5)
POTASSIUM SERPL-SCNC: 4 MMOL/L (ref 3.5–5.1)
RBC # BLD AUTO: 5.23 M/UL (ref 4.2–5.9)
SODIUM SERPL-SCNC: 139 MMOL/L (ref 136–145)
T4 FREE SERPL-MCNC: 1.2 NG/DL (ref 0.9–1.8)
TSH SERPL DL<=0.005 MIU/L-ACNC: <0.01 UIU/ML (ref 0.27–4.2)
VIT B12 SERPL-MCNC: 573 PG/ML (ref 211–911)
WBC # BLD AUTO: 6 K/UL (ref 4–11)

## 2025-04-15 PROCEDURE — APPSS60 APP SPLIT SHARED TIME 46-60 MINUTES

## 2025-04-15 PROCEDURE — 97530 THERAPEUTIC ACTIVITIES: CPT

## 2025-04-15 PROCEDURE — 80048 BASIC METABOLIC PNL TOTAL CA: CPT

## 2025-04-15 PROCEDURE — 96372 THER/PROPH/DIAG INJ SC/IM: CPT

## 2025-04-15 PROCEDURE — 82607 VITAMIN B-12: CPT

## 2025-04-15 PROCEDURE — 97116 GAIT TRAINING THERAPY: CPT

## 2025-04-15 PROCEDURE — 97165 OT EVAL LOW COMPLEX 30 MIN: CPT

## 2025-04-15 PROCEDURE — 36415 COLL VENOUS BLD VENIPUNCTURE: CPT

## 2025-04-15 PROCEDURE — 92610 EVALUATE SWALLOWING FUNCTION: CPT

## 2025-04-15 PROCEDURE — 97162 PT EVAL MOD COMPLEX 30 MIN: CPT

## 2025-04-15 PROCEDURE — 2500000003 HC RX 250 WO HCPCS: Performed by: PHYSICIAN ASSISTANT

## 2025-04-15 PROCEDURE — APPNB30 APP NON BILLABLE TIME 0-30 MINS

## 2025-04-15 PROCEDURE — 6370000000 HC RX 637 (ALT 250 FOR IP): Performed by: PHYSICIAN ASSISTANT

## 2025-04-15 PROCEDURE — 84443 ASSAY THYROID STIM HORMONE: CPT

## 2025-04-15 PROCEDURE — 83036 HEMOGLOBIN GLYCOSYLATED A1C: CPT

## 2025-04-15 PROCEDURE — 80061 LIPID PANEL: CPT

## 2025-04-15 PROCEDURE — 92526 ORAL FUNCTION THERAPY: CPT

## 2025-04-15 PROCEDURE — G0378 HOSPITAL OBSERVATION PER HR: HCPCS

## 2025-04-15 PROCEDURE — 6360000002 HC RX W HCPCS: Performed by: PHYSICIAN ASSISTANT

## 2025-04-15 PROCEDURE — 84439 ASSAY OF FREE THYROXINE: CPT

## 2025-04-15 PROCEDURE — 92523 SPEECH SOUND LANG COMPREHEN: CPT

## 2025-04-15 PROCEDURE — 85027 COMPLETE CBC AUTOMATED: CPT

## 2025-04-15 PROCEDURE — 97535 SELF CARE MNGMENT TRAINING: CPT

## 2025-04-15 PROCEDURE — 99222 1ST HOSP IP/OBS MODERATE 55: CPT | Performed by: PSYCHIATRY & NEUROLOGY

## 2025-04-15 PROCEDURE — 93010 ELECTROCARDIOGRAM REPORT: CPT | Performed by: INTERNAL MEDICINE

## 2025-04-15 PROCEDURE — 82746 ASSAY OF FOLIC ACID SERUM: CPT

## 2025-04-15 RX ORDER — CETIRIZINE HYDROCHLORIDE 10 MG/1
10 TABLET ORAL DAILY
Status: DISCONTINUED | OUTPATIENT
Start: 2025-04-15 | End: 2025-04-15 | Stop reason: HOSPADM

## 2025-04-15 RX ORDER — ATORVASTATIN CALCIUM 80 MG/1
80 TABLET, FILM COATED ORAL NIGHTLY
Qty: 30 TABLET | Refills: 3 | Status: SHIPPED | OUTPATIENT
Start: 2025-04-15

## 2025-04-15 RX ORDER — METOPROLOL SUCCINATE 50 MG/1
50 TABLET, EXTENDED RELEASE ORAL DAILY
Status: DISCONTINUED | OUTPATIENT
Start: 2025-04-15 | End: 2025-04-15 | Stop reason: HOSPADM

## 2025-04-15 RX ORDER — LISINOPRIL 20 MG/1
20 TABLET ORAL DAILY
Status: DISCONTINUED | OUTPATIENT
Start: 2025-04-15 | End: 2025-04-15 | Stop reason: HOSPADM

## 2025-04-15 RX ORDER — METHIMAZOLE 5 MG/1
7.5 TABLET ORAL DAILY
Status: DISCONTINUED | OUTPATIENT
Start: 2025-04-15 | End: 2025-04-15 | Stop reason: HOSPADM

## 2025-04-15 RX ORDER — DULOXETIN HYDROCHLORIDE 60 MG/1
60 CAPSULE, DELAYED RELEASE ORAL DAILY
Status: DISCONTINUED | OUTPATIENT
Start: 2025-04-15 | End: 2025-04-15 | Stop reason: HOSPADM

## 2025-04-15 RX ORDER — ASPIRIN 81 MG/1
81 TABLET, CHEWABLE ORAL DAILY
Qty: 30 TABLET | Refills: 3 | Status: SHIPPED | OUTPATIENT
Start: 2025-04-16

## 2025-04-15 RX ADMIN — METOPROLOL SUCCINATE 50 MG: 50 TABLET, EXTENDED RELEASE ORAL at 10:01

## 2025-04-15 RX ADMIN — LISINOPRIL 20 MG: 20 TABLET ORAL at 10:02

## 2025-04-15 RX ADMIN — ASPIRIN 81 MG: 81 TABLET, CHEWABLE ORAL at 10:02

## 2025-04-15 RX ADMIN — METHIMAZOLE 7.5 MG: 5 TABLET ORAL at 10:02

## 2025-04-15 RX ADMIN — DULOXETINE HYDROCHLORIDE 60 MG: 60 CAPSULE, DELAYED RELEASE ORAL at 10:02

## 2025-04-15 RX ADMIN — ENOXAPARIN SODIUM 40 MG: 100 INJECTION SUBCUTANEOUS at 10:03

## 2025-04-15 RX ADMIN — SODIUM CHLORIDE, PRESERVATIVE FREE 10 ML: 5 INJECTION INTRAVENOUS at 10:03

## 2025-04-15 RX ADMIN — CETIRIZINE HYDROCHLORIDE 10 MG: 10 TABLET, FILM COATED ORAL at 10:02

## 2025-04-15 NOTE — ED NOTES
Patient Name: John Bowden  : 1955 69 y.o.  MRN: [id number]  ED Room #: ED-0016/16     Chief complaint:   Chief Complaint   Patient presents with    Extremity Weakness     Pt presents to the ER with the complaint of weakness in the legs. Pt states he is having difficulties walking and with his balance. Pt states he has been falling numerous times. Pt states this has been going on for several months. Pt states he has been seen numerous times for this without resolve.      Hospital Problem/Diagnosis:   Hospital Problems           Last Modified POA    * (Principal) Ataxia 2025 Yes         O2 Flow Rate:O2 Device: None (Room air)   (if applicable)  Cardiac Rhythm:   (if applicable)  Active LDA's:   Peripheral IV 25 Distal;Right Forearm (Active)   Site Assessment Clean, dry & intact 25   Line Status Blood return noted;Flushed;Specimen collected;Normal saline locked 25   Line Care Cap changed 25   Phlebitis Assessment No symptoms 25   Infiltration Assessment 0 25 180   Alcohol Cap Used No 25 180   Dressing Status New dressing applied 25   Dressing Type Transparent 25   Dressing Intervention New 25            How does patient ambulate? Contact Guard    2. How does patient take pills? Whole with Water    3. Is patient alert? Alert    4. Is patient oriented? To Person, To Place, To Time, and To Situation    5.   Patient arrived from:  home  Facility Name: ___________________________________________    6. If patient is disoriented or from a Skill Nursing Facility has family been notified of admission? No    7. Patient belongings? Belongings: Cell Phone and Clothing    Disposition of belongings? Kept with Patient     8. Any specific patient or family belongings/needs/dynamics?   a. none    9. Miscellaneous comments/pending orders?  a. Multiple falls recently at home.      If there are any additional questions please

## 2025-04-15 NOTE — PROGRESS NOTES
[facility] - Inpatient Rehabilitation Department   Phone: [phone]    Occupational Therapy    [x] Initial Evaluation            [] Daily Treatment Note         [] Discharge Summary      Patient: John Bowden   : 1955   MRN: [id number]   Date of Service:  4/15/2025    Admitting Diagnosis:  Ataxia  Current Admission Summary: 69 y.o.  years old male with past medical history of Graves' disease, hypertension, remote stroke and other medical problems who comes to the hospital with gait instability.  Degree severe duration persistent.  Patient reports that he has been having gait difficulty over the last 2 years.   During this time no vision or speech disturbance, unilateral extremity weakness or paresthesias.  He reports he was having increased difficulty with his gait yesterday and his family brought him to the emergency room. In the emergency room CT head showed no acute intracranial normalities.  CTA showed no LVO.  Patient underwent MRI brain which showed no acute intracranial abnormality.  MRI C-spine showed chronic degenerative changes but no significant spinal stenosis.    Past Medical History:  has a past medical history of Concussion and Falls.  Past Surgical History:  has no past surgical history on file.  Discharge Recommendations: John Bowden scored a 21/24 on the AM-PAC ADL Inpatient form. Current research shows that an AM-PAC score of 18 or greater is typically associated with a discharge to the patient's home setting. Based on the patient's AM-PAC score, and their current ADL deficits, it is recommended that the patient have 2-3 sessions per week of Occupational Therapy at d/c to increase the patient's independence.  At this time, this patient demonstrates the endurance and safety to discharge home with HHOT-if patient declines, would recommend OP (home vs OP services) and a follow up treatment frequency of 2-3x/wk.   Please see assessment section for further patient  Employment: retired.  Occupation: [employer]   Hobbies: Eating, going to concerts/sporting events, walking   Recent Falls: Reports falling ~1x/month for the last 6 months.   Comments: Sleeps in a recliner     Available Assistance at Discharge: available PRN     Examination   Vision:   Vision Gross Assessment: Impaired and Vision Corrective Device: wears glasses for reading  Hearing:   hard of hearing  Observation:   General Observation:  Patient in semi-reyes's position. On RA, telemetry  Posture:   Forward head  Sensation:   denies numbness and tingling    Coordination Testing:               (B) UE, decreased speed, decreased accuracy   ROM:   (B) UE AROM WFL  Strength:   (B) UE strength grossly WFL    Therapist Clinical Decision Making (Complexity): low complexity  Clinical Presentation: stable      Subjective  General: Pt seated in recliner, agreeable to evaluation. SisterHansa, present for majority of session  Pain: 0/10  Pain Interventions: not applicable        Activities of Daily Living  Basic Activities of Daily Living  Grooming: setup assistance  Grooming Comments: intermittent stance to wash hands, face, and to complete oral care  Upper Extremity Bathing: setup assistance  Lower Extremity Bathing: stand by assistance   Upper Extremity Dressing: setup assistance stand by assistance  Lower Extremity Dressing: stand by assistance contact guard assistance  Toileting: stand by assistance.    Toileting Comments: sitting on toilet to urinate  Instrumental Activities of Daily Living  No IADL completed on this date.    Functional Mobility  Bed Mobility:  Bed mobility not completed on this date.  Comments:  Transfers:  Sit to stand transfer:stand by assistance  Stand to sit transfer: stand by assistance  Bed / Chair transfer: stand by assistance, contact guard assistance.    Bed / Chair equipment: rolling walker  Bed / Chair comments: patient impulsive throughout, requires cues for safety

## 2025-04-15 NOTE — CARE COORDINATION
Discharge Planning Note:    Chart reviewed and it appears that patient has minimal needs for discharge at this time. Risk Score N/A in OBS %     Primary Care Physician is True Rojas DO    Primary insurance is [employer]- medicare managed    Please notify case management if any discharge needs are identified.      Case management will continue to follow progress and update discharge plan as needed.    Update: Pt is DC home with OP PT/OT, does not want HHC. No IMM needed pt is in OBS.    Electronically signed by Louis Pantoja on 4/15/2025 at 8:12 AM  Electronically signed by Louis Pantoja on 4/15/2025 at 3:17 PM

## 2025-04-15 NOTE — PROGRESS NOTES
[facility] - Inpatient Rehabilitation Department   Phone: [phone]    Physical Therapy    [x] Initial Evaluation            [] Daily Treatment Note         [] Discharge Summary      Patient: John Bowden   : 1955   MRN: [id number]   Date of Service:  4/15/2025  Admitting Diagnosis: Ataxia  Current Admission Summary: The patient is a 69 y.o.  years old male with past medical history of Graves' disease, hypertension, remote stroke and other medical problems who comes to the hospital with gait instability.  Degree severe duration persistent.  Patient reports that he has been having gait difficulty over the last 2 years.   During this time no vision or speech disturbance, unilateral extremity weakness or paresthesias.  He reports he was having increased difficulty with his gait yesterday and his family brought him to the emergency room. In the emergency room CT head showed no acute intracranial normalities.  CTA showed no LVO.  Patient underwent MRI brain which showed no acute intracranial abnormality.  MRI C-spine showed chronic degenerative changes but no significant spinal stenosis.    Past Medical History:  has a past medical history of Concussion and Falls.  Past Surgical History:  has no past surgical history on file.  Discharge Recommendations: John Bowden scored a 18/24 on the AM-PAC short mobility form. Current research shows that an AM-PAC score of 18 or greater is typically associated with a discharge to the patient's home setting. Based on the patient's AM-PAC score and their current functional mobility deficits, it is recommended that the patient have 2-3 sessions per week of Physical Therapy at d/c to increase the patient's independence.  At this time, this patient demonstrates the endurance and safety to discharge home with home health PT and a follow up treatment frequency of 2-3x/wk.  Please see assessment section for further patient specific details. If patient

## 2025-04-15 NOTE — PROGRESS NOTES
Speech Language Pathology  [facility]  [phone]  SLP Clinical Swallow Evaluation and Speech Language Cognitive Assessment       Patient: John Bowden   : 1955   MRN: [id number]      Evaluation Date: 4/15/2025      Admitting Dx: Ataxia [R27.0]  Treatment Diagnosis: Cognitive-Linguistic Deficits , Oropharyngeal Dysphagia   Pain: Denies                                  Recommendations      Recommended Diet and Intervention 4/15/2025:  Diet Solids Recommendation:  Regular texture diet  Liquid Consistency Recommendation:  Thin liquids, No straws  Recommended form of Meds: Meds whole with water or Meds in puree          Compensatory strategies: Eat or Feed Slowly, Small Bites and Sips , Swallow 2 times per bite , Upright as possible with all PO intake     Discharge Recommendations:  No further follow-up appears indicated at this time.     History/Course of Treatment     H&P: The patient is a 69 y.o.  years old male with past medical history of Graves' disease, hypertension, remote stroke and other medical problems who comes to the hospital with gait instability.  Degree severe duration persistent.  Patient reports that he has been having gait difficulty over the last 2 years.    During this time no vision or speech disturbance, unilateral extremity weakness or paresthesias.  He reports he was having increased difficulty with his gait yesterday and his family brought him to the emergency room. In the emergency room CT head showed no acute intracranial normalities.  CTA showed no LVO.  Patient underwent MRI brain which showed no acute intracranial abnormality.  MRI C-spine showed chronic degenerative changes but no significant spinal stenosis.  He was admitted to the hospital for further evaluation     Imaging:    MRI:  IMPRESSION:  No acute intracranial or spinal cord abnormality identified.     Degenerative changes of the C-spine and incidentally noted thyroid

## 2025-04-15 NOTE — PLAN OF CARE
Problem: Discharge Planning  Goal: Discharge to home or other facility with appropriate resources  Recent Flowsheet Documentation  Taken 4/15/2025 1007 by Meme Hodgson, RN  Discharge to home or other facility with appropriate resources: Identify barriers to discharge with patient and caregiver  4/15/2025 0443 by Amy Jacob, RN  Outcome: Progressing  Flowsheets (Taken 4/14/2025 2224)  Discharge to home or other facility with appropriate resources: Identify barriers to discharge with patient and caregiver     Problem: Safety - Adult  Goal: Free from fall injury  4/15/2025 0443 by Amy Jacob, RN  Outcome: Progressing

## 2025-04-15 NOTE — PROGRESS NOTES
Pharmacy Home Medication Reconciliation Note    A medication reconciliation has been completed for John Bowden 1955    Pharmacy: [facility] [address] [place] [address]  Information provided by: patient and sister    The patient's home medication list is as follows:  No current facility-administered medications on file prior to encounter.     Current Outpatient Medications on File Prior to Encounter   Medication Sig Dispense Refill    carbamide peroxide (DEBROX) 6.5 % otic solution Place 2 drops into both ears 2 times daily      DULoxetine HCl 60 MG CSDR Take 1 capsule by mouth daily      cetirizine (ZYRTEC) 10 MG tablet Take 1 tablet by mouth daily      methIMAzole (TAPAZOLE) 5 MG tablet TAKE 1 AND 1/2 TABLET BY MOUTH DAILY (Patient taking differently: Take 1.5 tablets by mouth daily Indications: Graves' Disease) 90 tablet 0    metoprolol succinate (TOPROL XL) 50 MG extended release tablet Take 1 tablet by mouth daily      lisinopril (PRINIVIL;ZESTRIL) 20 MG tablet Take 1 tablet by mouth daily 30 tablet 11    docusate sodium (COLACE) 100 MG capsule Take 1 capsule by mouth 2 times daily      metoprolol-hydroCHLOROthiazide (LOPRESSOR HCT) 50-25 MG per tablet Take 1 tablet by mouth 2 times daily (Patient not taking: Reported on 4/14/2025)      colesevelam (WELCHOL) 625 MG tablet Take 1 tablet by mouth 2 times daily (with meals) (Patient not taking: Reported on 4/14/2025)      diphenhydrAMINE-APAP, sleep, (EXCEDRIN PM)  MG TABS EXCEDRIN -38 MG TABS (Patient not taking: Reported on 4/14/2025)         Patient is no longer taking Welchol or Lopressor HCT (taking Toprol).    Of note, patient took all AM meds prior to ED arrival.    Timing of last doses updated.    Thank you,  Millicent Lemus, Jason

## 2025-04-15 NOTE — H&P
WITHOUT CONTRAST; CT OF THE CERVICAL SPINE WITHOUT CONTRAST 4/14/2025 6:05 pm TECHNIQUE: CT of the head was performed without the administration of intravenous contrast. Automated exposure control, iterative reconstruction, and/or weight based adjustment of the mA/kV was utilized to reduce the radiation dose to as low as reasonably achievable.; CT of the cervical spine was performed without the administration of intravenous contrast. Multiplanar reformatted images are provided for review. Automated exposure control, iterative reconstruction, and/or weight based adjustment of the mA/kV was utilized to reduce the radiation dose to as low as reasonably achievable. COMPARISON: Neck ultrasound 01/23/2024. HISTORY: ORDERING SYSTEM PROVIDED HISTORY: ataxia, ams, ro post stroke TECHNOLOGIST PROVIDED HISTORY: Reason for exam:->ataxia, ams, ro post stroke Has a \"code stroke\" or \"stroke alert\" been called?->No Decision Support Exception - unselect if not a suspected or confirmed emergency medical condition->Emergency Medical Condition (MA) Reason for Exam: ataxia, ams, ro post stroke; ORDERING SYSTEM PROVIDED HISTORY: trauma, pain, atxaxia, ro fx, scc TECHNOLOGIST PROVIDED HISTORY: Reason for exam:->trauma, pain, atxaxia, ro fx, scc Decision Support Exception - unselect if not a suspected or confirmed emergency medical condition->Emergency Medical Condition (MA) Reason for Exam: ataxia, ams, ro post stroke FINDINGS: CT head: BRAIN/VENTRICLES: There is no acute intracranial hemorrhage, mass effect or midline shift.  No abnormal extra-axial fluid collection.  The gray-white differentiation is maintained without evidence of an acute infarct. Age-appropriate atrophy and small-vessel disease ischemic changes.  Mild ventriculomegaly. ORBITS: The visualized portion of the orbits demonstrate no acute abnormality. SINUSES: The visualized paranasal sinuses and mastoid air cells demonstrate no acute abnormality. SOFT TISSUES/SKULL:  No  acute abnormality of the visualized skull or soft tissues. CT cervical spine: Normal alignment of the cervical spine.  C1-C2 and odontoid appear stable. Spinous processes and posterior elements appear intact.  Mild multilevel degenerative disc disease.  No acute paraspinal soft tissue abnormality. Incidental enlargement of the left thyroid lobe with a lesion occupying the majority of the lobe which measures 6.2 x 5.1 cm.  The right thyroid lobe is normal.  This lesion extends into the upper anterior mediastinum and causes right tracheal deviation.     1. No acute intracranial abnormality. Age-appropriate atrophy and small-vessel disease ischemic changes. 2. Ventricular enlargement. 3. No acute osseous abnormality of the cervical spine. 4. Incidental enlargement of the left thyroid lobe with a lesion occupying the majority of the lobe which measures 6.2 x 5.1 cm. The right thyroid lobe is normal. This lesion extends into the upper anterior mediastinum and causes right tracheal deviation.  This lesion was previously seen on thyroid ultrasound from 01/23/2024 with no significant change.       Total time spent caring for the patient today was 75 minutes. This includes time spent before the visit reviewing the chart, time spent during the visit, and time spent after the visit on documentation.    (Please note that portions of this note were completed with a voice recognition program.  Efforts were made to edit the dictations but occasionally words are mis-transcribed.)     Whit Kulkarni PA-C

## 2025-04-15 NOTE — DISCHARGE INSTR - COC
Continuity of Care Form    Patient Name: John Bowden   :  1955  MRN:  [id number]    Admit date:  2025  Discharge date:  ***    Code Status Order: Full Code   Advance Directives:     Admitting Physician:  Andreas Lin DO  PCP: True Rojas DO    Discharging Nurse: ***  Discharging Hospital Unit/Room#: 3TN-3357/3357-02  Discharging Unit Phone Number: ***    Emergency Contact:   Extended Emergency Contact Information  Primary Emergency Contact: Sobia Pacheco  Home Phone: [phone]  Mobile Phone: [phone]  Relation: Brother/Sister    Past Surgical History:  History reviewed. No pertinent surgical history.    Immunization History:   Immunization History   Administered Date(s) Administered    COVID-19, MODERNA BLUE border, Primary or Immunocompromised, (age 12y+), IM, 100 mcg/0.5mL 2021, 2021, 2021, 2022    COVID-19, MODERNA Bivalent, (age 12y+), IM, 50 mcg/0.5 mL 10/31/2022    COVID-19, MODERNA, , (age 12y+), IM, 50mcg/0.5mL 2023       Active Problems:  Patient Active Problem List   Diagnosis Code    Graves disease E05.00    Hyperthyroidism E05.90    Thyroid nodule, cold E04.1    Ataxia R27.0       Isolation/Infection:   Isolation            No Isolation          Patient Infection Status    None to display         Nurse Assessment:  Last Vital Signs: /79   Pulse 83   Temp 97.7 °F (36.5 °C) (Oral)   Resp 17   Ht 1.702 m (5' 7\")   Wt 87 kg (191 lb 12.8 oz)   SpO2 96%   BMI 30.04 kg/m²     Last documented pain score (0-10 scale):    Last Weight:   Wt Readings from Last 1 Encounters:   04/15/25 87 kg (191 lb 12.8 oz)     Mental Status:  {IP PT MENTAL STATUS:}    IV Access:  { [facility] IV ACCESS:476810743}    Nursing Mobility/ADLs:  Walking   {[facility] DME ADLs:770486148}  Transfer  {[facility] DME ADLs:526166634}  Bathing  {[facility] DME ADLs:438388500}  Dressing  {[facility] DME ADLs:446193660}  Toileting  {[facility] DME ADLs:587566488}  Feeding  {[facility] DME

## 2025-04-15 NOTE — DISCHARGE INSTRUCTIONS
Your information:  Name: John Bowden  : 1955    Your Discharge Instructions    What to do after you leave the hospital:    Read, review and familiarize yourself with the information provided below and in a separate packet on   Fall Prevention and Falls: Get Up Safely    Diet:  Regular diet, no drinking straws  Recommended activity:   As tolerated  Avoid strenuous activity until instructed by your physician to resume; balance rest with periods of light to normal activity.     If you experience any of the following: Unusual or inadequately controlled pain; unusual transient shortness of breath; recurrent or persistent nausea, heartburn, palpitations or lightheadedness; increased swelling; increased fatigue; fever >100; please follow up with @PCP@ [unfilled] or go to the Emergency Room.      Home Health/ Outpatient Services: PT/OT  Follow up with PCP in one week.      Information obtained by:  By signing below, I understand and acknowledge receipt of the instructions indicated above, and I understand that if any problems occur once I leave the hospital I am to contact @PCP@.

## 2025-04-15 NOTE — CONSULTS
In patient Neurology consult    [facility] Neurology  Citlali Sharif MD      John Bowden  1955    Date of Service: 4/15/2025    Referring Physician: Enzo García MD      Reason for the consult and CC: Worsening unsteady gait    Most the history is obtained from detailed chart review and discussion with the patient.  He is not a good historian unable to provide accurate history.    HPI:   The patient is a 69 y.o.  years old male with past medical history of Graves' disease, hypertension, remote stroke and other medical problems who comes to the hospital with gait instability.  Degree severe duration persistent.  Patient reports that he has been having gait difficulty over the last 2 years.    During this time no vision or speech disturbance, unilateral extremity weakness or paresthesias.  He reports he was having increased difficulty with his gait yesterday and his family brought him to the emergency room. In the emergency room CT head showed no acute intracranial normalities.  CTA showed no LVO.  Patient underwent MRI brain which showed no acute intracranial abnormality.  MRI C-spine showed chronic degenerative changes but no significant spinal stenosis.  He was admitted to the hospital for further evaluation.  Neurology was consulted.    Chart reviewed discussed with the patient.  Patient reports ongoing issues for approximately 2 years.  He has been evaluated by neurology in outpatient setting.  I was able to review his last neurology clinic note (06/13/2024).  Patient was to have additional workup including EMG though he has not followed up on testing and appears he is lost to follow-up.  Patient also reports history of hip and knee arthritis and has pain which also limit his mobility.  He denies alcohol use.  Today patient denies any headaches, dizziness, dysarthria, dysphagia, extremity weakness or paresthesias.  Other review of systems unremarkable       Constitutional:   Vitals:    04/15/25 0430

## 2025-04-15 NOTE — PLAN OF CARE
Problem: Discharge Planning  Goal: Discharge to home or other facility with appropriate resources  Outcome: Progressing  Flowsheets (Taken 4/14/2025 2224)  Discharge to home or other facility with appropriate resources: Identify barriers to discharge with patient and caregiver     Problem: Safety - Adult  Goal: Free from fall injury  Outcome: Progressing

## 2025-04-15 NOTE — DISCHARGE SUMMARY
[facility] DISCHARGE SUMMARY    Patient Demographics    Patient. John Bowden  Date of Birth. 1955  MRN. [id number]     Primary care provider. True Rojas DO  (Tel: [phone])    Admit date: 4/14/2025    Discharge date (blank if same as Note Date):   Note Date: 4/15/2025     Reason for Hospitalization.   Chief Complaint   Patient presents with    Extremity Weakness     Pt presents to the ER with the complaint of weakness in the legs. Pt states he is having difficulties walking and with his balance. Pt states he has been falling numerous times. Pt states this has been going on for several months. Pt states he has been seen numerous times for this without resolve.            Problem-based Hospital Course.  Ataxia.  With lower extremity weakness.  With known history of Parkinson.  Acute stroke ruled out no acute fracture.  Patient discharged stable after neurology evaluation aspirin statin initiate    Consults.  IP CONSULT TO NEUROLOGY    Physical examination on discharge day.   /79   Pulse 83   Temp 97.7 °F (36.5 °C) (Oral)   Resp 17   Ht 1.702 m (5' 7\")   Wt 87 kg (191 lb 12.8 oz)   SpO2 96%   BMI 30.04 kg/m²   General appearance.  Alert. Looks comfortable.  HEENT. Sclera clear. Moist mucus membranes.  Cardiovascular. Regular rate and rhythm, normal S1, S2. No murmur.   Respiratory. Not using accessory muscles.Clear to auscultation bilaterally, no wheeze.  Gastrointestinal. Abdomen soft, non-tender, not distended, normal bowel sounds  Neurology. Facial symmetry. No speech deficits. Moving all extremities equally.  Extremities. No edema in lower extremities.  Skin. Warm, dry, normal turgor    Condition at time of discharge stable     Medication instructions provided to patient at discharge.     Medication List        START taking these

## 2025-04-16 LAB
CHOLEST SERPL-MCNC: 120 MG/DL (ref 0–199)
HDLC SERPL-MCNC: 41 MG/DL (ref 40–60)
LDLC SERPL CALC-MCNC: 64 MG/DL
TRIGL SERPL-MCNC: 74 MG/DL (ref 0–150)
VLDLC SERPL CALC-MCNC: 15 MG/DL

## 2025-07-30 ENCOUNTER — TELEPHONE (OUTPATIENT)
Dept: ENDOCRINOLOGY | Age: 70
End: 2025-07-30

## 2025-07-30 DIAGNOSIS — E05.00 GRAVES DISEASE: ICD-10-CM

## 2025-07-30 RX ORDER — METHIMAZOLE 5 MG/1
7.5 TABLET ORAL DAILY
Qty: 90 TABLET | Refills: 0 | Status: SHIPPED | OUTPATIENT
Start: 2025-07-30

## 2025-07-30 NOTE — TELEPHONE ENCOUNTER
Medication:   Requested Prescriptions     Pending Prescriptions Disp Refills    methIMAzole (TAPAZOLE) 5 MG tablet 90 tablet 0         Last Labs DM:   Lab Results   Component Value Date/Time    LABA1C 5.6 04/15/2025 04:21 AM     Last Lipid:   Lab Results   Component Value Date/Time    CHOL 120 04/15/2025 04:21 AM    TRIG 74 04/15/2025 04:21 AM    HDL 41 04/15/2025 04:21 AM     Last PSA: No results found for: \"PSA\"  Last Thyroid:   Lab Results   Component Value Date/Time    TSH <0.01 07/02/2024 05:05 PM    TSH <0.01 09/06/2023 03:00 PM    T4FREE 1.2 04/15/2025 04:21 AM

## 2025-07-30 NOTE — TELEPHONE ENCOUNTER
Pt calling and states he needs refill on his Methamazole    Pharmacy info - [facility] on [address] in [facility]# [phone]